# Patient Record
Sex: MALE | Race: OTHER | NOT HISPANIC OR LATINO
[De-identification: names, ages, dates, MRNs, and addresses within clinical notes are randomized per-mention and may not be internally consistent; named-entity substitution may affect disease eponyms.]

---

## 2020-10-13 PROBLEM — Z00.00 ENCOUNTER FOR PREVENTIVE HEALTH EXAMINATION: Status: ACTIVE | Noted: 2020-10-13

## 2020-10-19 ENCOUNTER — APPOINTMENT (OUTPATIENT)
Dept: SURGERY | Facility: CLINIC | Age: 45
End: 2020-10-19
Payer: COMMERCIAL

## 2020-10-19 VITALS
WEIGHT: 250 LBS | HEART RATE: 127 BPM | SYSTOLIC BLOOD PRESSURE: 152 MMHG | BODY MASS INDEX: 35.79 KG/M2 | HEIGHT: 70 IN | DIASTOLIC BLOOD PRESSURE: 113 MMHG | OXYGEN SATURATION: 96 % | TEMPERATURE: 98.7 F

## 2020-10-19 PROCEDURE — 99072 ADDL SUPL MATRL&STAF TM PHE: CPT

## 2020-10-19 PROCEDURE — 99202 OFFICE O/P NEW SF 15 MIN: CPT | Mod: 25

## 2020-10-19 NOTE — HISTORY OF PRESENT ILLNESS
[de-identified] : 44 yo male with complex surgical history related to perforated sigmoid diverticulitis who presents to the office for evaluation of left sided ostomy site hernia. He states that the area is getting larger and starting to get more noticeable.

## 2020-10-19 NOTE — PHYSICAL EXAM
[JVD] : no jugular venous distention  [Normal Breath Sounds] : Normal breath sounds [Normal Heart Sounds] : normal heart sounds [Abdominal Masses] : No abdominal masses [Normal Rate and Rhythm] : normal rate and rhythm [No HSM] : no hepatosplenomegaly [Abdomen Tenderness] : ~T ~M No abdominal tenderness [Tender] : was nontender [No Rash or Lesion] : No rash or lesion [Enlarged] : not enlarged [Oriented to Person] : oriented to person [Alert] : alert [Oriented to Time] : oriented to time [Oriented to Place] : oriented to place [Calm] : calm [de-identified] : alert and oriented x 3 [de-identified] : normal [de-identified] : reducible ostomy site hernia

## 2020-11-09 ENCOUNTER — APPOINTMENT (OUTPATIENT)
Dept: SURGERY | Facility: CLINIC | Age: 45
End: 2020-11-09

## 2021-01-12 ENCOUNTER — OUTPATIENT (OUTPATIENT)
Dept: OUTPATIENT SERVICES | Facility: HOSPITAL | Age: 46
LOS: 1 days | Discharge: HOME | End: 2021-01-12
Payer: COMMERCIAL

## 2021-01-12 VITALS
DIASTOLIC BLOOD PRESSURE: 95 MMHG | SYSTOLIC BLOOD PRESSURE: 126 MMHG | HEART RATE: 100 BPM | RESPIRATION RATE: 18 BRPM | TEMPERATURE: 98 F | OXYGEN SATURATION: 98 % | HEIGHT: 69 IN | WEIGHT: 246.92 LBS

## 2021-01-12 DIAGNOSIS — Z98.890 OTHER SPECIFIED POSTPROCEDURAL STATES: Chronic | ICD-10-CM

## 2021-01-12 DIAGNOSIS — I10 ESSENTIAL (PRIMARY) HYPERTENSION: ICD-10-CM

## 2021-01-12 DIAGNOSIS — K42.9 UMBILICAL HERNIA WITHOUT OBSTRUCTION OR GANGRENE: ICD-10-CM

## 2021-01-12 DIAGNOSIS — Z93.3 COLOSTOMY STATUS: Chronic | ICD-10-CM

## 2021-01-12 DIAGNOSIS — K46.9 UNSPECIFIED ABDOMINAL HERNIA WITHOUT OBSTRUCTION OR GANGRENE: ICD-10-CM

## 2021-01-12 DIAGNOSIS — K40.90 UNILATERAL INGUINAL HERNIA, WITHOUT OBSTRUCTION OR GANGRENE, NOT SPECIFIED AS RECURRENT: ICD-10-CM

## 2021-01-12 DIAGNOSIS — E78.5 HYPERLIPIDEMIA, UNSPECIFIED: ICD-10-CM

## 2021-01-12 DIAGNOSIS — Z01.818 ENCOUNTER FOR OTHER PREPROCEDURAL EXAMINATION: ICD-10-CM

## 2021-01-12 DIAGNOSIS — N49.3 FOURNIER GANGRENE: Chronic | ICD-10-CM

## 2021-01-12 LAB
ANION GAP SERPL CALC-SCNC: 6 MMOL/L — SIGNIFICANT CHANGE UP (ref 5–17)
APTT BLD: 37.6 SEC — HIGH (ref 27.5–35.5)
BUN SERPL-MCNC: 18 MG/DL — SIGNIFICANT CHANGE UP (ref 7–23)
CALCIUM SERPL-MCNC: 9.3 MG/DL — SIGNIFICANT CHANGE UP (ref 8.5–10.1)
CHLORIDE SERPL-SCNC: 107 MMOL/L — SIGNIFICANT CHANGE UP (ref 96–108)
CO2 SERPL-SCNC: 25 MMOL/L — SIGNIFICANT CHANGE UP (ref 22–31)
CREAT SERPL-MCNC: 0.85 MG/DL — SIGNIFICANT CHANGE UP (ref 0.5–1.3)
GLUCOSE SERPL-MCNC: 105 MG/DL — HIGH (ref 70–99)
HCT VFR BLD CALC: 53 % — HIGH (ref 39–50)
HGB BLD-MCNC: 18.1 G/DL — HIGH (ref 13–17)
INR BLD: 0.99 RATIO — SIGNIFICANT CHANGE UP (ref 0.88–1.16)
MCHC RBC-ENTMCNC: 30.5 PG — SIGNIFICANT CHANGE UP (ref 27–34)
MCHC RBC-ENTMCNC: 34.2 GM/DL — SIGNIFICANT CHANGE UP (ref 32–36)
MCV RBC AUTO: 89.2 FL — SIGNIFICANT CHANGE UP (ref 80–100)
NRBC # BLD: 0 /100 WBCS — SIGNIFICANT CHANGE UP (ref 0–0)
PLATELET # BLD AUTO: 318 K/UL — SIGNIFICANT CHANGE UP (ref 150–400)
POTASSIUM SERPL-MCNC: 4.1 MMOL/L — SIGNIFICANT CHANGE UP (ref 3.5–5.3)
POTASSIUM SERPL-SCNC: 4.1 MMOL/L — SIGNIFICANT CHANGE UP (ref 3.5–5.3)
PROTHROM AB SERPL-ACNC: 11.5 SEC — SIGNIFICANT CHANGE UP (ref 10.6–13.6)
RBC # BLD: 5.94 M/UL — HIGH (ref 4.2–5.8)
RBC # FLD: 14.2 % — SIGNIFICANT CHANGE UP (ref 10.3–14.5)
SODIUM SERPL-SCNC: 138 MMOL/L — SIGNIFICANT CHANGE UP (ref 135–145)
WBC # BLD: 10.61 K/UL — HIGH (ref 3.8–10.5)
WBC # FLD AUTO: 10.61 K/UL — HIGH (ref 3.8–10.5)

## 2021-01-12 PROCEDURE — 93010 ELECTROCARDIOGRAM REPORT: CPT

## 2021-01-12 NOTE — H&P PST ADULT - NSICDXPASTSURGICALHX_GEN_ALL_CORE_FT
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

## 2021-01-12 NOTE — H&P PST ADULT - NSANTHOSAYNRD_GEN_A_CORE
No. DANY screening performed.  STOP BANG Legend: 0-2 = LOW Risk; 3-4 = INTERMEDIATE Risk; 5-8 = HIGH Risk

## 2021-01-12 NOTE — H&P PST ADULT - HISTORY OF PRESENT ILLNESS
45M pmh htn (diet controlled), hld, diverticulosis s/p numerous abdominal surgeries here for PST for scheduled incisional hernia repair whti mesh, left inguinal hernia repair with mesh, possible open, open umbilical hernia repair  This patient denies any fever, cough, sob, flu like symptoms or travel outside of [facility] in the past 30 days

## 2021-01-12 NOTE — H&P PST ADULT - NSICDXPROBLEM_GEN_ALL_CORE_FT
PROBLEM DIAGNOSES  Problem: Umbilical hernia without obstruction and without gangrene  Assessment and Plan: Robotic assisted laparoscopic recurrent incisional hernia repair with mesh, left inguinal hernia repair with mesh, possible open, open umbilical hernia repair, possible mesh  Pre-op instructions given by PA patient verbalized understanding  Chlorhexidine wash instructions given   Medical clearance pending    Problem: Unilateral inguinal hernia, without obstruction or gangrene, not specified as recurrent  Assessment and Plan: Robotic assisted laparoscopic recurrent icisional hernia repair with mesh, left inguinal hernia repair with mesh, possible open, open umbilical hernia repari, possible mesh    Problem: Unspecified abdominal hernia without obstruction or gangrene  Assessment and Plan: Robotic assisted laparoscopic recurrent incisional hernia repair with mesh, left inguinal hernia repair with mesh, possible open, open umbilical henria repair, possible mesh    Problem: HTN (hypertension)  Assessment and Plan: diet controlled, Continue current regimen     Problem: HLD (hyperlipidemia)  Assessment and Plan: Continue current regimen and medications.

## 2021-01-12 NOTE — H&P PST ADULT - NSICDXPASTMEDICALHX_GEN_ALL_CORE_FT
PAST MEDICAL HISTORY:  Diverticulosis     HLD (hyperlipidemia)     HTN (hypertension) diet controlled

## 2021-01-16 DIAGNOSIS — Z01.818 ENCOUNTER FOR OTHER PREPROCEDURAL EXAMINATION: ICD-10-CM

## 2021-01-19 ENCOUNTER — APPOINTMENT (OUTPATIENT)
Dept: DISASTER EMERGENCY | Facility: CLINIC | Age: 46
End: 2021-01-19

## 2021-01-20 LAB — SARS-COV-2 N GENE NPH QL NAA+PROBE: NOT DETECTED

## 2021-01-21 ENCOUNTER — TRANSCRIPTION ENCOUNTER (OUTPATIENT)
Age: 46
End: 2021-01-21

## 2021-01-22 ENCOUNTER — INPATIENT (INPATIENT)
Facility: HOSPITAL | Age: 46
LOS: 0 days | Discharge: HOME | End: 2021-01-23
Attending: SURGERY | Admitting: SURGERY
Payer: COMMERCIAL

## 2021-01-22 ENCOUNTER — APPOINTMENT (OUTPATIENT)
Dept: SURGERY | Facility: HOSPITAL | Age: 46
End: 2021-01-22

## 2021-01-22 VITALS
OXYGEN SATURATION: 98 % | HEART RATE: 100 BPM | TEMPERATURE: 99 F | SYSTOLIC BLOOD PRESSURE: 126 MMHG | DIASTOLIC BLOOD PRESSURE: 88 MMHG | HEIGHT: 69 IN | WEIGHT: 246.04 LBS | RESPIRATION RATE: 18 BRPM

## 2021-01-22 DIAGNOSIS — Z98.890 OTHER SPECIFIED POSTPROCEDURAL STATES: Chronic | ICD-10-CM

## 2021-01-22 DIAGNOSIS — N49.3 FOURNIER GANGRENE: Chronic | ICD-10-CM

## 2021-01-22 DIAGNOSIS — Z93.3 COLOSTOMY STATUS: Chronic | ICD-10-CM

## 2021-01-22 PROCEDURE — 49650 LAP ING HERNIA REPAIR INIT: CPT

## 2021-01-22 PROCEDURE — 49652: CPT

## 2021-01-22 PROCEDURE — 49561: CPT | Mod: AS

## 2021-01-22 PROCEDURE — 49505 PRP I/HERN INIT REDUC >5 YR: CPT | Mod: AS

## 2021-01-22 PROCEDURE — S2900 ROBOTIC SURGICAL SYSTEM: CPT | Mod: NC

## 2021-01-22 RX ORDER — TRAZODONE HCL 50 MG
1 TABLET ORAL
Qty: 0 | Refills: 0 | DISCHARGE

## 2021-01-22 RX ORDER — IBUPROFEN 200 MG
600 TABLET ORAL EVERY 6 HOURS
Refills: 0 | Status: DISCONTINUED | OUTPATIENT
Start: 2021-01-22 | End: 2021-01-23

## 2021-01-22 RX ORDER — ATORVASTATIN CALCIUM 80 MG/1
1 TABLET, FILM COATED ORAL
Qty: 0 | Refills: 0 | DISCHARGE

## 2021-01-22 RX ORDER — CEFAZOLIN SODIUM 1 G
1000 VIAL (EA) INJECTION EVERY 8 HOURS
Refills: 0 | Status: COMPLETED | OUTPATIENT
Start: 2021-01-22 | End: 2021-01-22

## 2021-01-22 RX ORDER — HYDROMORPHONE HYDROCHLORIDE 2 MG/ML
0.5 INJECTION INTRAMUSCULAR; INTRAVENOUS; SUBCUTANEOUS
Refills: 0 | Status: DISCONTINUED | OUTPATIENT
Start: 2021-01-22 | End: 2021-01-22

## 2021-01-22 RX ORDER — SODIUM CHLORIDE 9 MG/ML
3 INJECTION INTRAMUSCULAR; INTRAVENOUS; SUBCUTANEOUS EVERY 8 HOURS
Refills: 0 | Status: DISCONTINUED | OUTPATIENT
Start: 2021-01-22 | End: 2021-01-22

## 2021-01-22 RX ORDER — GABAPENTIN 400 MG/1
1 CAPSULE ORAL
Qty: 0 | Refills: 0 | DISCHARGE

## 2021-01-22 RX ORDER — OXYCODONE HYDROCHLORIDE 5 MG/1
5 TABLET ORAL EVERY 4 HOURS
Refills: 0 | Status: DISCONTINUED | OUTPATIENT
Start: 2021-01-22 | End: 2021-01-23

## 2021-01-22 RX ORDER — HEPARIN SODIUM 5000 [USP'U]/ML
5000 INJECTION INTRAVENOUS; SUBCUTANEOUS EVERY 12 HOURS
Refills: 0 | Status: DISCONTINUED | OUTPATIENT
Start: 2021-01-22 | End: 2021-01-23

## 2021-01-22 RX ORDER — SODIUM CHLORIDE 9 MG/ML
1000 INJECTION, SOLUTION INTRAVENOUS
Refills: 0 | Status: DISCONTINUED | OUTPATIENT
Start: 2021-01-22 | End: 2021-01-23

## 2021-01-22 RX ORDER — OXYCODONE HYDROCHLORIDE 5 MG/1
10 TABLET ORAL EVERY 4 HOURS
Refills: 0 | Status: DISCONTINUED | OUTPATIENT
Start: 2021-01-22 | End: 2021-01-23

## 2021-01-22 RX ORDER — ACETAMINOPHEN 500 MG
975 TABLET ORAL EVERY 6 HOURS
Refills: 0 | Status: DISCONTINUED | OUTPATIENT
Start: 2021-01-22 | End: 2021-01-23

## 2021-01-22 RX ORDER — CYCLOBENZAPRINE HYDROCHLORIDE 10 MG/1
10 TABLET, FILM COATED ORAL THREE TIMES A DAY
Refills: 0 | Status: DISCONTINUED | OUTPATIENT
Start: 2021-01-22 | End: 2021-01-23

## 2021-01-22 RX ORDER — SODIUM CHLORIDE 9 MG/ML
1000 INJECTION, SOLUTION INTRAVENOUS
Refills: 0 | Status: DISCONTINUED | OUTPATIENT
Start: 2021-01-22 | End: 2021-01-22

## 2021-01-22 RX ADMIN — HYDROMORPHONE HYDROCHLORIDE 0.5 MILLIGRAM(S): 2 INJECTION INTRAMUSCULAR; INTRAVENOUS; SUBCUTANEOUS at 18:52

## 2021-01-22 RX ADMIN — OXYCODONE HYDROCHLORIDE 10 MILLIGRAM(S): 5 TABLET ORAL at 21:46

## 2021-01-22 RX ADMIN — HEPARIN SODIUM 5000 UNIT(S): 5000 INJECTION INTRAVENOUS; SUBCUTANEOUS at 23:23

## 2021-01-22 RX ADMIN — SODIUM CHLORIDE 125 MILLILITER(S): 9 INJECTION, SOLUTION INTRAVENOUS at 21:49

## 2021-01-22 RX ADMIN — HYDROMORPHONE HYDROCHLORIDE 0.5 MILLIGRAM(S): 2 INJECTION INTRAMUSCULAR; INTRAVENOUS; SUBCUTANEOUS at 15:47

## 2021-01-22 RX ADMIN — Medication 100 MILLIGRAM(S): at 15:42

## 2021-01-22 RX ADMIN — Medication 600 MILLIGRAM(S): at 20:43

## 2021-01-22 RX ADMIN — HYDROMORPHONE HYDROCHLORIDE 0.5 MILLIGRAM(S): 2 INJECTION INTRAMUSCULAR; INTRAVENOUS; SUBCUTANEOUS at 15:23

## 2021-01-22 RX ADMIN — SODIUM CHLORIDE 75 MILLILITER(S): 9 INJECTION, SOLUTION INTRAVENOUS at 12:12

## 2021-01-22 RX ADMIN — HYDROMORPHONE HYDROCHLORIDE 0.5 MILLIGRAM(S): 2 INJECTION INTRAMUSCULAR; INTRAVENOUS; SUBCUTANEOUS at 13:18

## 2021-01-22 RX ADMIN — Medication 975 MILLIGRAM(S): at 20:43

## 2021-01-22 NOTE — BRIEF OPERATIVE NOTE - NSICDXBRIEFPROCEDURE_GEN_ALL_CORE_FT
PROCEDURES:  Repair, hernia, umbilical, robot-assisted 22-Jan-2021 11:38:08 repair with mesh Marion Weinstein  Robot-assisted repair of inguinal hernia 22-Jan-2021 11:37:40 LIH repair w/ mesh Marion Weinstein  Robot-assisted repair of incisional hernia 22-Jan-2021 11:37:26  Marion Weinstein

## 2021-01-22 NOTE — PROGRESS NOTE ADULT - SUBJECTIVE AND OBJECTIVE BOX
POSTOP CHECK:  S/p Robot assisted laparoscopic repair of umbilical hernia with mesh, Recurrent Left inguinal hernia with mesh, and Incisional hernia POD 0.   Patient c/o postop abdominal pain, mostly over incisional hernia repair, rated as 5/10 in severity, well controlled with pain medications. Denies N/V. Denies Flatus or BM yet.   Tolerating regular diet.  Voiding well postop.   Denies fever/chills, chest pain, or sob.     Vital Signs Last 24 Hrs  T(C): 36.6 (22 Jan 2021 22:00), Max: 37.1 (22 Jan 2021 06:39)  T(F): 97.9 (22 Jan 2021 22:00), Max: 98.7 (22 Jan 2021 06:39)  HR: 87 (22 Jan 2021 22:00) (83 - 102)  BP: 106/66 (22 Jan 2021 22:00) (100/57 - 126/88)  RR: 18 (22 Jan 2021 22:00) (12 - 19)  SpO2: 95% (22 Jan 2021 22:00) (93% - 98%)    PHYSICAL EXAM:    GENERAL: NAD  CHEST/LUNG: Clear to ausculation, bilaterally   HEART: S1S2, RRR  ABDOMEN: Dressings over port sites CDI. Mildly distended. Soft, appropriate incisional tenderness, no guarding.   EXTREMITIES:  calf soft, non tender b/l. 2+ distal pulses b/l.     I&O's Detail    22 Jan 2021 07:01  -  22 Jan 2021 23:10  --------------------------------------------------------  IN:    IV PiggyBack: 50 mL    Lactated Ringers: 535 mL  Total IN: 585 mL    OUT:    Indwelling Catheter - Urethral (mL): 600 mL  Total OUT: 600 mL    Total NET: -15 mL      A/P: 45M with PMH HTN (diet controlled), HLD, diverticulitis admitted s/p Elective Robot-assisted laparoscopic repair of Umbilical hernia with mesh, Recurrent LIH with mesh and incisional hernia POD 0.  Stable.    -Regular diet as tolerated  -local wound care  -pain control PRN  -DVT ppx  -OOB to ambulate as tolerated, Incentive spirometer   -medical management and supportive care  -D/C planning Saturday if pain controlled  -Discussed with attending

## 2021-01-22 NOTE — PATIENT PROFILE ADULT - NSPRESCRALCSIXMORE_GEN_A_NUR
Problem: Patient Care Overview (Adult)  Goal: Plan of Care Review  Outcome: Ongoing (interventions implemented as appropriate)    07/20/17 0953   Patient Care Overview   Progress improving   Outcome Evaluation   Outcome Summary/Follow up Plan no noted problems            Monthly

## 2021-01-22 NOTE — BRIEF OPERATIVE NOTE - NSICDXBRIEFPOSTOP_GEN_ALL_CORE_FT
POST-OP DIAGNOSIS:  Umbilical hernia 22-Jan-2021 11:39:55  Marion Weinstein  Incarcerated incisional hernia 22-Jan-2021 11:39:41  Marion Weinstein  Left inguinal hernia 22-Jan-2021 11:39:33  Marion Weinstein

## 2021-01-22 NOTE — BRIEF OPERATIVE NOTE - NSICDXBRIEFPREOP_GEN_ALL_CORE_FT
PRE-OP DIAGNOSIS:  Left inguinal hernia 22-Jan-2021 11:39:17  Marion Weinstein  Incarcerated incisional hernia 22-Jan-2021 11:39:04  Marion Weinstein  Umbilical hernia 22-Jan-2021 11:39:26  Marion Weinstein

## 2021-01-22 NOTE — ASU PATIENT PROFILE, ADULT - PSH
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

## 2021-01-23 ENCOUNTER — TRANSCRIPTION ENCOUNTER (OUTPATIENT)
Age: 46
End: 2021-01-23

## 2021-01-23 VITALS
SYSTOLIC BLOOD PRESSURE: 146 MMHG | TEMPERATURE: 98 F | OXYGEN SATURATION: 95 % | HEART RATE: 97 BPM | RESPIRATION RATE: 18 BRPM | DIASTOLIC BLOOD PRESSURE: 80 MMHG

## 2021-01-23 RX ORDER — IBUPROFEN 200 MG
1 TABLET ORAL
Qty: 0 | Refills: 0 | DISCHARGE
Start: 2021-01-23

## 2021-01-23 RX ORDER — OXYCODONE HYDROCHLORIDE 5 MG/1
1 TABLET ORAL
Qty: 20 | Refills: 0
Start: 2021-01-23 | End: 2021-01-27

## 2021-01-23 RX ORDER — CYCLOBENZAPRINE HYDROCHLORIDE 10 MG/1
1 TABLET, FILM COATED ORAL
Qty: 10 | Refills: 0
Start: 2021-01-23 | End: 2021-01-27

## 2021-01-23 RX ORDER — HYDROMORPHONE HYDROCHLORIDE 2 MG/ML
0.5 INJECTION INTRAMUSCULAR; INTRAVENOUS; SUBCUTANEOUS ONCE
Refills: 0 | Status: DISCONTINUED | OUTPATIENT
Start: 2021-01-23 | End: 2021-01-23

## 2021-01-23 RX ORDER — ACETAMINOPHEN 500 MG
3 TABLET ORAL
Qty: 0 | Refills: 0 | DISCHARGE
Start: 2021-01-23

## 2021-01-23 RX ADMIN — Medication 975 MILLIGRAM(S): at 03:54

## 2021-01-23 RX ADMIN — Medication 600 MILLIGRAM(S): at 06:32

## 2021-01-23 RX ADMIN — HYDROMORPHONE HYDROCHLORIDE 0.5 MILLIGRAM(S): 2 INJECTION INTRAMUSCULAR; INTRAVENOUS; SUBCUTANEOUS at 00:33

## 2021-01-23 RX ADMIN — CYCLOBENZAPRINE HYDROCHLORIDE 10 MILLIGRAM(S): 10 TABLET, FILM COATED ORAL at 00:17

## 2021-01-23 RX ADMIN — CYCLOBENZAPRINE HYDROCHLORIDE 10 MILLIGRAM(S): 10 TABLET, FILM COATED ORAL at 06:32

## 2021-01-23 RX ADMIN — OXYCODONE HYDROCHLORIDE 10 MILLIGRAM(S): 5 TABLET ORAL at 03:34

## 2021-01-23 NOTE — DISCHARGE NOTE NURSING/CASE MANAGEMENT/SOCIAL WORK - NSDCFUADDAPPT_GEN_ALL_CORE_FT
Follow up with Dr. Quezaad in the office in 1-2 weeks, call the office to schedule your appointment. If any concerns/issues, please call Dr. Quezada and see him in the office Monday if needed.  Follow up with your PCP.

## 2021-01-23 NOTE — PROGRESS NOTE ADULT - SUBJECTIVE AND OBJECTIVE BOX
INTERVAL HPI/OVERNIGHT EVENTS:  S/p Robot assisted laparoscopic repair of umbilical hernia with mesh, Recurrent Left inguinal hernia with mesh, and Incisional hernia POD #1.    Patient c/o postop abdominal pain, well controlled with pain medications. Denies N/V. Tolerating diet. No acute overnight events.   Admits to passing flatus overnight, denies BM. Has not been out of bed yet.  Voiding well postop.   Denies fever/chills, chest pain, or sob.     Vital Signs Last 24 Hrs  T(C): 36.7 (23 Jan 2021 04:30), Max: 37.1 (22 Jan 2021 06:39)  T(F): 98 (23 Jan 2021 04:30), Max: 98.7 (22 Jan 2021 06:39)  HR: 82 (23 Jan 2021 04:30) (82 - 102)  BP: 109/68 (23 Jan 2021 04:30) (100/57 - 126/88)  RR: 18 (23 Jan 2021 04:30) (12 - 19)  SpO2: 94% (23 Jan 2021 04:30) (92% - 98%)    PHYSICAL EXAM:  GENERAL: NAD  CHEST/LUNG: Clear to ausculation, bilaterally   HEART: S1S2, RRR  ABDOMEN: Dressings over port sites CDI. Mildly distended. Soft, appropriate incisional tenderness, no guarding.   EXTREMITIES:  calf soft, non tender b/l. 2+ distal pulses b/l.     I&O's Detail    22 Jan 2021 07:01  -  23 Jan 2021 05:49  --------------------------------------------------------  IN:    IV PiggyBack: 50 mL    Lactated Ringers: 535 mL  Total IN: 585 mL    OUT:    Indwelling Catheter - Urethral (mL): 600 mL    Voided (mL): 400 mL  Total OUT: 1000 mL    Total NET: -415 mL      A/P: 45M with PMH HTN (diet controlled), HLD, diverticulitis admitted s/p Elective Robot-assisted laparoscopic repair of Umbilical hernia with mesh, Recurrent LIH with mesh and incisional hernia POD #1, HD stable.    -Regular diet as tolerated  -local wound care  -pain control PRN  -DVT ppx  -OOB to ambulate as tolerated, Incentive spirometer   -medical management and supportive care  -D/C planning today  -Will discuss with surgical attending    INTERVAL HPI/OVERNIGHT EVENTS:  S/p Robot assisted laparoscopic repair of umbilical hernia with mesh, Recurrent Left inguinal hernia with mesh, and Incisional hernia POD #1.    Patient c/o postop abdominal pain, well controlled with pain medications. Denies N/V. Tolerating diet. No acute overnight events.   Admits to passing flatus overnight, denies BM. Has not been out of bed yet.  Voiding well postop.   Denies fever/chills, chest pain, or sob.     Vital Signs Last 24 Hrs  T(C): 36.7 (23 Jan 2021 04:30), Max: 37.1 (22 Jan 2021 06:39)  T(F): 98 (23 Jan 2021 04:30), Max: 98.7 (22 Jan 2021 06:39)  HR: 82 (23 Jan 2021 04:30) (82 - 102)  BP: 109/68 (23 Jan 2021 04:30) (100/57 - 126/88)  RR: 18 (23 Jan 2021 04:30) (12 - 19)  SpO2: 94% (23 Jan 2021 04:30) (92% - 98%)    PHYSICAL EXAM:  GENERAL: NAD  CHEST/LUNG: Clear to ausculation, bilaterally   HEART: S1S2, RRR  ABDOMEN: Dressings over port sites CDI. Mildly distended. Soft, appropriate incisional tenderness, no guarding.   EXTREMITIES:  calf soft, non tender b/l. 2+ distal pulses b/l.     I&O's Detail    22 Jan 2021 07:01  -  23 Jan 2021 05:49  --------------------------------------------------------  IN:    IV PiggyBack: 50 mL    Lactated Ringers: 535 mL  Total IN: 585 mL    OUT:    Indwelling Catheter - Urethral (mL): 600 mL    Voided (mL): 400 mL  Total OUT: 1000 mL    Total NET: -415 mL      A/P: 45M with PMH HTN (diet controlled), HLD, diverticulitis admitted s/p Elective Robot-assisted laparoscopic repair of Umbilical hernia with mesh, Recurrent LIH with mesh and incisional hernia POD #1, HD stable.    -Regular diet as tolerated  -local wound care  -pain control PRN  -DVT ppx  -OOB to ambulate as tolerated, Incentive spirometer   -medical management and supportive care  Discussed with LAURA Grigsby to d/c patient home today, f/u in the office

## 2021-01-23 NOTE — DISCHARGE NOTE PROVIDER - CARE PROVIDER_API CALL
Son Quezada)  Surgery  [address] [place] [address]  Phone: [phone]  Fax: [phone]  Follow Up Time:

## 2021-01-23 NOTE — DISCHARGE NOTE NURSING/CASE MANAGEMENT/SOCIAL WORK - PATIENT PORTAL LINK FT
You can access the FollowMyHealth Patient Portal offered by [facility] by registering at the following website: [email or web address]. By joining [employer]’s FollowMyHealth portal, you will also be able to view your health information using other applications (apps) compatible with our system.

## 2021-01-23 NOTE — DISCHARGE NOTE PROVIDER - NSDCMRMEDTOKEN_GEN_ALL_CORE_FT
gabapentin: 1 tab(s) orally once a day  Lipitor: 1 tab(s) orally once a day  traZODone: 1 tab(s) orally once a day   acetaminophen 325 mg oral tablet: 3 tab(s) orally every 6 hours, As Needed  cyclobenzaprine 5 mg oral tablet: 1 tab(s) orally 2 times a day, As Needed -for muscle spasm   gabapentin: 1 tab(s) orally once a day  ibuprofen 600 mg oral tablet: 1 tab(s) orally every 6 hours, As Needed  Lipitor: 1 tab(s) orally once a day  oxyCODONE 5 mg oral tablet: 1-2 tab(s) orally every 6 hours, As Needed -for severe pain MDD:4   traZODone: 1 tab(s) orally once a day

## 2021-01-23 NOTE — DISCHARGE NOTE NURSING/CASE MANAGEMENT/SOCIAL WORK - NSDCPEWEB_GEN_ALL_CORE
[facility] for Tobacco Control website --- [email or web address] website --- [email or web address]

## 2021-01-23 NOTE — DISCHARGE NOTE PROVIDER - NSDCFUADDAPPT_GEN_ALL_CORE_FT
Follow up with Dr. Quezada in the office in 1-2 weeks, call the office to schedule your appointment.   Follow up with your PCP.  Follow up with Dr. Quezada in the office in 1-2 weeks, call the office to schedule your appointment. If any concerns/issues, please call Dr. Quezada and see him in the office Monday if needed.  Follow up with your PCP.

## 2021-01-23 NOTE — DISCHARGE NOTE PROVIDER - HOSPITAL COURSE
45M with PMH HTN (diet controlled), HLD, diverticulitis admitted s/p Elective Robot-assisted laparoscopic repair of Umbilical hernia with mesh, Recurrent LIH with mesh and incisional hernia. Patient was admitted for observation and pain control. Patient did well postop, tolerated advancement of diet with return of bowel function. Voiding and ambulating well. Pain adequately controlled. Vitals stable.  Patient hemodynamically stable at time of discharge on POD #1 once tolerating a regular diet and pain controlled. Follow up with Dr. Quezada in the office in 1-2 weeks.

## 2021-01-23 NOTE — DISCHARGE NOTE NURSING/CASE MANAGEMENT/SOCIAL WORK - NSDCPEEMAIL_GEN_ALL_CORE
[facility] for Tobacco Control email [email or web address]

## 2021-01-23 NOTE — DISCHARGE NOTE PROVIDER - NSDCFUADDINST_GEN_ALL_CORE_FT
OK to shower after 48hrs, allow soapy water to run over abdomen and pat dry. Allow steri strip bandages to fall off by themselves.     OK to use OTC Tylenol and/or Motrin for mild-moderate pain in addition to prescribed narcotic pain medication. Do not drive if taking prescribed pain medications.    Please notify MD sooner or return to the ER with any new or worsening symptoms, uncontrollable pain, foul smelling discharge or drainage from wound, excessive bleeding or swelling, fever >101, chest pain, shortness of breath, abdominal pain, nausea/vomiting, or other concerns/problems.

## 2021-01-23 NOTE — DISCHARGE NOTE PROVIDER - NSDCCPCAREPLAN_GEN_ALL_CORE_FT
PRINCIPAL DISCHARGE DIAGNOSIS  Diagnosis: Umbilical hernia  Assessment and Plan of Treatment: s/p robot-assisted laparoscopic repair of umbilical hernia with mesh      SECONDARY DISCHARGE DIAGNOSES  Diagnosis: Incisional hernia  Assessment and Plan of Treatment: s/p Robot-assisted laparoscopic repair of incisional hernia    Diagnosis: Recurrent left inguinal hernia  Assessment and Plan of Treatment: S/p robot-assisted laparoscopic repair of LIH with mesh

## 2021-01-27 PROBLEM — E78.5 HYPERLIPIDEMIA, UNSPECIFIED: Chronic | Status: ACTIVE | Noted: 2021-01-12

## 2021-01-27 PROBLEM — I10 ESSENTIAL (PRIMARY) HYPERTENSION: Chronic | Status: ACTIVE | Noted: 2021-01-12

## 2021-01-27 PROBLEM — K57.90 DIVERTICULOSIS OF INTESTINE, PART UNSPECIFIED, WITHOUT PERFORATION OR ABSCESS WITHOUT BLEEDING: Chronic | Status: ACTIVE | Noted: 2021-01-12

## 2021-02-01 DIAGNOSIS — I10 ESSENTIAL (PRIMARY) HYPERTENSION: ICD-10-CM

## 2021-02-01 DIAGNOSIS — K40.90 UNILATERAL INGUINAL HERNIA, WITHOUT OBSTRUCTION OR GANGRENE, NOT SPECIFIED AS RECURRENT: ICD-10-CM

## 2021-02-01 DIAGNOSIS — K42.9 UMBILICAL HERNIA WITHOUT OBSTRUCTION OR GANGRENE: ICD-10-CM

## 2021-02-01 DIAGNOSIS — K43.2 INCISIONAL HERNIA WITHOUT OBSTRUCTION OR GANGRENE: ICD-10-CM

## 2021-02-01 DIAGNOSIS — E78.5 HYPERLIPIDEMIA, UNSPECIFIED: ICD-10-CM

## 2021-02-01 DIAGNOSIS — K66.0 PERITONEAL ADHESIONS (POSTPROCEDURAL) (POSTINFECTION): ICD-10-CM

## 2021-02-04 ENCOUNTER — APPOINTMENT (OUTPATIENT)
Dept: SURGERY | Facility: CLINIC | Age: 46
End: 2021-02-04
Payer: COMMERCIAL

## 2021-02-04 VITALS
TEMPERATURE: 98.6 F | DIASTOLIC BLOOD PRESSURE: 90 MMHG | WEIGHT: 250 LBS | SYSTOLIC BLOOD PRESSURE: 138 MMHG | BODY MASS INDEX: 35.79 KG/M2 | OXYGEN SATURATION: 98 % | HEIGHT: 70 IN | HEART RATE: 129 BPM

## 2021-02-04 PROCEDURE — 99024 POSTOP FOLLOW-UP VISIT: CPT

## 2021-02-04 NOTE — HISTORY OF PRESENT ILLNESS
[de-identified] : pt seen and examined. pt is s/p robotic assisted incisional hernia and left inguinal hernia. wound edges are healing well. there is seroma which i advised the patient that it will get better overtime. pt will come back in six weeks for follow up visit.

## 2021-03-15 ENCOUNTER — APPOINTMENT (OUTPATIENT)
Dept: SURGERY | Facility: CLINIC | Age: 46
End: 2021-03-15
Payer: COMMERCIAL

## 2021-03-15 VITALS
HEART RATE: 130 BPM | TEMPERATURE: 96.8 F | HEIGHT: 70 IN | BODY MASS INDEX: 35.79 KG/M2 | SYSTOLIC BLOOD PRESSURE: 119 MMHG | WEIGHT: 250 LBS | DIASTOLIC BLOOD PRESSURE: 98 MMHG | OXYGEN SATURATION: 97 %

## 2021-03-15 DIAGNOSIS — K46.9 UNSPECIFIED ABDOMINAL HERNIA W/OUT OBSTRUCTION OR GANGRENE: ICD-10-CM

## 2021-03-15 PROCEDURE — 99024 POSTOP FOLLOW-UP VISIT: CPT

## 2021-03-15 NOTE — HISTORY OF PRESENT ILLNESS
[de-identified] : Pt seen and examined. Pt is s/p robotic assisted ventral hernia and left inguinal hernia. Wound edges are healing well and seroma has resolved. Pt was advised to resume all activities as tolerated.

## 2021-07-04 NOTE — H&P PST ADULT - ASSESSMENT
Statement Selected
45M pmh htn (diet controlled), hld, diverticulosis s/p numerous abdominal surgeries here for PST for scheduled incisional hernia repair whti mesh, left inguinal hernia repair with mesh, possible open, open umbilical hernia repair  CAPRINI SCORE    AGE RELATED RISK FACTORS                                                       MOBILITY RELATED FACTORS  [ x] Age 41-60 years                                            (1 Point)                  [ ] Bed rest                                                        (1 Point)  [ ] Age: 61-74 years                                           (2 Points)                [ ] Plaster cast                                                   (2 Points)  [ ] Age= 75 years                                              (3 Points)                 [ ] Bed bound for more than 72 hours                   (2 Points)    DISEASE RELATED RISK FACTORS                                               GENDER SPECIFIC FACTORS  [ ] Edema in the lower extremities                       (1 Point)                  [ ] Pregnancy                                                     (1 Point)  [ ] Varicose veins                                               (1 Point)                  [ ] Post-partum < 6 weeks                                   (1 Point)             [x ] BMI > 25 Kg/m2                                            (1 Point)                  [ ] Hormonal therapy  or oral contraception            (1 Point)                 [ ] Sepsis (in the previous month)                        (1 Point)                  [ ] History of pregnancy complications  [ ] Pneumonia or serious lung disease                                               [ ] Unexplained or recurrent                       (1 Point)           (in the previous month)                               (1 Point)  [ ] Abnormal pulmonary function test                     (1 Point)                 SURGERY RELATED RISK FACTORS  [ ] Acute myocardial infarction                              (1 Point)                 [ ]  Section                                            (1 Point)  [ ] Congestive heart failure (in the previous month)  (1 Point)                 [ ] Minor surgery                                                 (1 Point)   [ ] Inflammatory bowel disease                             (1 Point)                 [ ] Arthroscopic surgery                                        (2 Points)  [ ] Central venous access                                    (2 Points)                [x ] General surgery lasting more than 45 minutes   (2 Points)       [ ] Stroke (in the previous month)                          (5 Points)               [ ] Elective arthroplasty                                        (5 Points)                                                                                                                                               HEMATOLOGY RELATED FACTORS                                                 TRAUMA RELATED RISK FACTORS  [ ] Prior episodes of VTE                                     (3 Points)                 [ ] Fracture of the hip, pelvis, or leg                       (5 Points)  [ ] Positive family history for VTE                         (3 Points)                 [ ] Acute spinal cord injury (in the previous month)  (5 Points)  [ ] Prothrombin 77104 A                                      (3 Points)                 [ ] Paralysis  (less than 1 month)                          (5 Points)  [ ] Factor V Leiden                                             (3 Points)                 [ ] Multiple Trauma within 1 month                         (5 Points)  [ ] Lupus anticoagulants                                     (3 Points)                                                           [ ] Anticardiolipin antibodies                                (3 Points)                                                       [ ] High homocysteine in the blood                      (3 Points)                                             [ ] Other congenital or acquired thrombophilia       (3 Points)                                                [ ] Heparin induced thrombocytopenia                  (3 Points)                                          Total Score [   4       ]

## 2021-12-13 ENCOUNTER — EMERGENCY (EMERGENCY)
Facility: HOSPITAL | Age: 46
LOS: 1 days | Discharge: HOME | End: 2021-12-13
Attending: EMERGENCY MEDICINE
Payer: COMMERCIAL

## 2021-12-13 VITALS
WEIGHT: 244.93 LBS | HEIGHT: 69 IN | HEART RATE: 114 BPM | OXYGEN SATURATION: 97 % | DIASTOLIC BLOOD PRESSURE: 97 MMHG | RESPIRATION RATE: 19 BRPM | TEMPERATURE: 98 F | SYSTOLIC BLOOD PRESSURE: 143 MMHG

## 2021-12-13 DIAGNOSIS — Z98.890 OTHER SPECIFIED POSTPROCEDURAL STATES: Chronic | ICD-10-CM

## 2021-12-13 DIAGNOSIS — Z90.49 ACQUIRED ABSENCE OF OTHER SPECIFIED PARTS OF DIGESTIVE TRACT: Chronic | ICD-10-CM

## 2021-12-13 DIAGNOSIS — N49.3 FOURNIER GANGRENE: Chronic | ICD-10-CM

## 2021-12-13 DIAGNOSIS — Z87.19 PERSONAL HISTORY OF OTHER DISEASES OF THE DIGESTIVE SYSTEM: Chronic | ICD-10-CM

## 2021-12-13 DIAGNOSIS — Z93.3 COLOSTOMY STATUS: Chronic | ICD-10-CM

## 2021-12-13 PROCEDURE — 99284 EMERGENCY DEPT VISIT MOD MDM: CPT

## 2021-12-13 RX ORDER — IBUPROFEN 200 MG
400 TABLET ORAL ONCE
Refills: 0 | Status: COMPLETED | OUTPATIENT
Start: 2021-12-13 | End: 2021-12-13

## 2021-12-13 RX ORDER — ACETAMINOPHEN 500 MG
650 TABLET ORAL ONCE
Refills: 0 | Status: COMPLETED | OUTPATIENT
Start: 2021-12-13 | End: 2021-12-13

## 2021-12-13 RX ADMIN — Medication 400 MILLIGRAM(S): at 17:02

## 2021-12-13 RX ADMIN — Medication 650 MILLIGRAM(S): at 17:02

## 2021-12-13 NOTE — ED ADULT NURSE NOTE - OBJECTIVE STATEMENT
Pt reports reoccurrence of testicular abscess over past 5 days.  Denies fevers, chills or drainage.  There is a scabbed over area, surrounded by mild swelling on right testicle.  States area is itchy.

## 2021-12-13 NOTE — ED PROVIDER NOTE - PATIENT PORTAL LINK FT
You can access the FollowMyHealth Patient Portal offered by [facility] by registering at the following website: [email or web address]. By joining [employer]’s FollowMyHealth portal, you will also be able to view your health information using other applications (apps) compatible with our system.

## 2021-12-13 NOTE — ED PROVIDER NOTE - NSFOLLOWUPINSTRUCTIONS_ED_ALL_ED_FT
Follow up with your Urologist in 2-3 days or call our clinic at [phone]//[phone].    Take your antibiotics until they are fully completed.    There were no signs of an emergency medical condition on completion of today's workup.  You will need further medical care and evaluation. A presumptive diagnosis has been made, however further evaluation may be required by your primary care doctor or specialist for a definitive diagnosis.      Follow up with your medical doctor in 2-3 days or call our clinic at [phone] and state you were seen in the Emergency Department and would like to be seen in clinic. You may also call [phone] to speak with a representative to assist follow up care with medicine, surgery, or specialists.    If you are having pain, take Tylenol/acetaminophen 1 g every six hours and supplement (if allowed by your physician, and if you're not having gastric/gastrointestinal/stomach/intestinal problems) with ibuprofen 600 mg, with food or milk/maalox, every six hours which can be taken three hours apart from the Tylenol to have a layered effect.     Be sure to take no more than 4000mg or 4g of Tylenol/acetaminophen in a 24 hour period. Be sure to check your other medications to see if they include Tylenol/acetaminophen and include them in your calculations to ensure you do not take more than 4000mg or 4g of Tylenol/acetaminophen a day.    Drink at least 2 Liters or 64 Ounces of water each day (UNLESS you are supposed to restrict fluids or have a history of congestive heart failure (CHF)).    Return for any persistent, worsening symptoms, or ANY concerns at all.

## 2021-12-13 NOTE — ED PROVIDER NOTE - CLINICAL SUMMARY MEDICAL DECISION MAKING FREE TEXT BOX
Harpreet Woodall MD, FACEP: In this physician's medical judgement based on clinical history and physical exam, patient with scrotal pain and swelling at 5:30 location with history of Columba's gangrene now with fluctuance at scrotum and concern for infection. Will consult uro PA and follow plan of care for urology team for scrotal lesion with excoriation.    *The above represents an initial assessment/impression. Please refer to progress notes for potential changes in patient clinical course*

## 2021-12-13 NOTE — ED PROVIDER NOTE - PHYSICAL EXAMINATION
GEN: mild distress secondary to pain, awake, eyes open spontaneously  EYES: normal conjunctiva, perrl  ENT: NCAT, MMM, Trachea midline  CHEST/LUNGS: Non-tachypneic, CTAB, bilateral breath sounds  CARDIAC: Non-tachycardic, normal perfusion  ABDOMEN: Soft, NTND, No rebound/guarding, obese by bmi  : indurated intertriginous areas with darkening skin changes, black eschar at 5:50 on base of scrotum, mild 1cm fluctuant region at this location without active drainage, no palpated increased warmth, no inguinal hernias palpated  MSK: No edema, no gross deformity of extremities  SKIN: No rashes, no petechiae, no vesicles  NEURO: CN grossly intact, normal coordination, no focal motor or sensory deficits  PSYCH: Alert, appropriate, cooperative, with capacity and insight

## 2021-12-13 NOTE — ED PROVIDER NOTE - NSICDXPASTMEDICALHX_GEN_ALL_CORE_FT
PAST MEDICAL HISTORY:  History of diverticulitis     History of Columba's gangrene     Hyperlipidemia     Hypertension

## 2021-12-13 NOTE — ED PROVIDER NOTE - PROGRESS NOTE DETAILS
Deya RAMIREZ paged MELANY Eubanks consulted for evaluation and treatment. Yina: Patient returned from Ultrasound and states with pressure, abscess started to drain pus, and blood.  spoke with urology. urology PA awaiting radiology results and attempting to contact patient urologist. Lyssa: patient abscess examined and draining pus, small puncture wound. Patient requesting to go home. spoke with urology again and awaiting attending callback still. will give iv abx. Yina: Patient declining iv abx. requesting to go home. has f/u on friday with his own urologist.  will give oral antibiotics and d/c home. urology PA at bedside and evaluated. will d/c home at this time. Yina: Patient declining iv abx. requesting to go home. has f/u on friday with his own urologist.  will give oral antibiotics and d/c home. urology PA at bedside and evaluated. will d/c home at this time.  spoke with patient has one partner for 20 years his wife.

## 2021-12-13 NOTE — ED PROVIDER NOTE - RAPID ASSESSMENT
46y M presents to ED c/o abscess to R testicle x4 days. Reports worsening pain and redness to site. Has appointment with uro this Friday but pain too severe and pt couldn't wait until then. Denies fever, abd pain, nausea, and vomiting. No hx of DM. Denies IV drug use or injecting anything to site.     Patient was seen as a tele QDOC patient. The patient will be seen and further worked up in the main emergency department and their care will be completed by the main emergency department team along with a thorough physical exam. Receiving team will follow up on labs, analgesia, any clinical imaging, reassess and disposition as clinically indicated, all decisions regarding the progression of care will be made at their discretion.    Scribe Statement: I, Ramandeep Albrecht, attest that this documentation has been prepared under the direction and in the presence of Mina Boland) 46y M presents to ED c/o abscess to R testicle x4 days. Reports worsening pain and redness to site. Has appointment with uro this Friday but pain too severe and pt couldn't wait until then. Denies fever, abd pain, nausea, and vomiting. No hx of DM. Denies IV drug use or injecting anything to site.     Patient was seen as a tele QDOC patient. The patient will be seen and further worked up in the main emergency department and their care will be completed by the main emergency department team along with a thorough physical exam. Receiving team will follow up on labs, analgesia, any clinical imaging, reassess and disposition as clinically indicated, all decisions regarding the progression of care will be made at their discretion.    Scribe Statement: I, Ramandeep Albrecht, attest that this documentation has been prepared under the direction and in the presence of Mina Boland)    Krystian ALDANA: The scribe's documentation has been prepared under my direction and personally reviewed by me in its entirety. I confirm that the note above accurately reflects all work, treatment, procedures, and medical decision making performed by me (Dr. Boland).     Attending Note --     I saw the patient waiting area via televideo connection; a brief history was taken and a thorough physical exam was not performed as there is no physical exam room available.  The patient will be seen and further worked up in the main emergency department and their care will be completed by the main emergency department team.  I was not involved in this patient's care during the QDOC process, and unless otherwise noted in the ED provider note, was not involved in their care during their ED course.  --ANTONIA

## 2021-12-13 NOTE — ED PROVIDER NOTE - OBJECTIVE STATEMENT
Patient with lesion to the bottom of his scrotum. Patient states he's felt a lump for some time but that it became more fluid filled in sensation and had questionable signs of when he had a prior infection and swelling consistent with gangrene. Patient is unsure if he has had drainage from the area. No fever chills. Patient states worsening over the past three days concerning for a collection.

## 2021-12-14 VITALS
HEART RATE: 100 BPM | SYSTOLIC BLOOD PRESSURE: 145 MMHG | RESPIRATION RATE: 18 BRPM | DIASTOLIC BLOOD PRESSURE: 100 MMHG | OXYGEN SATURATION: 98 % | TEMPERATURE: 98 F

## 2021-12-14 LAB
ALBUMIN SERPL ELPH-MCNC: 4.5 G/DL — SIGNIFICANT CHANGE UP (ref 3.3–5)
ALP SERPL-CCNC: 120 U/L — SIGNIFICANT CHANGE UP (ref 40–120)
ALT FLD-CCNC: 35 U/L — SIGNIFICANT CHANGE UP (ref 10–45)
ANION GAP SERPL CALC-SCNC: 12 MMOL/L — SIGNIFICANT CHANGE UP (ref 5–17)
APPEARANCE UR: CLEAR — SIGNIFICANT CHANGE UP
APTT BLD: 32.8 SEC — SIGNIFICANT CHANGE UP (ref 27.5–35.5)
AST SERPL-CCNC: 18 U/L — SIGNIFICANT CHANGE UP (ref 10–40)
BACTERIA # UR AUTO: NEGATIVE — SIGNIFICANT CHANGE UP
BASOPHILS # BLD AUTO: 0.12 K/UL — SIGNIFICANT CHANGE UP (ref 0–0.2)
BASOPHILS NFR BLD AUTO: 0.8 % — SIGNIFICANT CHANGE UP (ref 0–2)
BILIRUB SERPL-MCNC: 0.5 MG/DL — SIGNIFICANT CHANGE UP (ref 0.2–1.2)
BILIRUB UR-MCNC: NEGATIVE — SIGNIFICANT CHANGE UP
BUN SERPL-MCNC: 14 MG/DL — SIGNIFICANT CHANGE UP (ref 7–23)
CALCIUM SERPL-MCNC: 9.3 MG/DL — SIGNIFICANT CHANGE UP (ref 8.4–10.5)
CHLORIDE SERPL-SCNC: 99 MMOL/L — SIGNIFICANT CHANGE UP (ref 96–108)
CO2 SERPL-SCNC: 25 MMOL/L — SIGNIFICANT CHANGE UP (ref 22–31)
COLOR SPEC: YELLOW — SIGNIFICANT CHANGE UP
CREAT SERPL-MCNC: 0.84 MG/DL — SIGNIFICANT CHANGE UP (ref 0.5–1.3)
DIFF PNL FLD: NEGATIVE — SIGNIFICANT CHANGE UP
EOSINOPHIL # BLD AUTO: 0.37 K/UL — SIGNIFICANT CHANGE UP (ref 0–0.5)
EOSINOPHIL NFR BLD AUTO: 2.6 % — SIGNIFICANT CHANGE UP (ref 0–6)
EPI CELLS # UR: 1 /HPF — SIGNIFICANT CHANGE UP
GLUCOSE SERPL-MCNC: 105 MG/DL — HIGH (ref 70–99)
GLUCOSE UR QL: NEGATIVE — SIGNIFICANT CHANGE UP
HCT VFR BLD CALC: 51.8 % — HIGH (ref 39–50)
HGB BLD-MCNC: 17 G/DL — SIGNIFICANT CHANGE UP (ref 13–17)
HYALINE CASTS # UR AUTO: 1 /LPF — SIGNIFICANT CHANGE UP (ref 0–2)
IMM GRANULOCYTES NFR BLD AUTO: 0.8 % — SIGNIFICANT CHANGE UP (ref 0–1.5)
INR BLD: 0.97 RATIO — SIGNIFICANT CHANGE UP (ref 0.88–1.16)
KETONES UR-MCNC: NEGATIVE — SIGNIFICANT CHANGE UP
LEUKOCYTE ESTERASE UR-ACNC: NEGATIVE — SIGNIFICANT CHANGE UP
LYMPHOCYTES # BLD AUTO: 19.4 % — SIGNIFICANT CHANGE UP (ref 13–44)
LYMPHOCYTES # BLD AUTO: 2.76 K/UL — SIGNIFICANT CHANGE UP (ref 1–3.3)
MCHC RBC-ENTMCNC: 30.4 PG — SIGNIFICANT CHANGE UP (ref 27–34)
MCHC RBC-ENTMCNC: 32.8 GM/DL — SIGNIFICANT CHANGE UP (ref 32–36)
MCV RBC AUTO: 92.5 FL — SIGNIFICANT CHANGE UP (ref 80–100)
MONOCYTES # BLD AUTO: 1.05 K/UL — HIGH (ref 0–0.9)
MONOCYTES NFR BLD AUTO: 7.4 % — SIGNIFICANT CHANGE UP (ref 2–14)
NEUTROPHILS # BLD AUTO: 9.82 K/UL — HIGH (ref 1.8–7.4)
NEUTROPHILS NFR BLD AUTO: 69 % — SIGNIFICANT CHANGE UP (ref 43–77)
NITRITE UR-MCNC: NEGATIVE — SIGNIFICANT CHANGE UP
NRBC # BLD: 0 /100 WBCS — SIGNIFICANT CHANGE UP (ref 0–0)
PH UR: 6 — SIGNIFICANT CHANGE UP (ref 5–8)
PLATELET # BLD AUTO: 309 K/UL — SIGNIFICANT CHANGE UP (ref 150–400)
POTASSIUM SERPL-MCNC: 4 MMOL/L — SIGNIFICANT CHANGE UP (ref 3.5–5.3)
POTASSIUM SERPL-SCNC: 4 MMOL/L — SIGNIFICANT CHANGE UP (ref 3.5–5.3)
PROT SERPL-MCNC: 7.7 G/DL — SIGNIFICANT CHANGE UP (ref 6–8.3)
PROT UR-MCNC: ABNORMAL
PROTHROM AB SERPL-ACNC: 11.7 SEC — SIGNIFICANT CHANGE UP (ref 10.6–13.6)
RBC # BLD: 5.6 M/UL — SIGNIFICANT CHANGE UP (ref 4.2–5.8)
RBC # FLD: 13.2 % — SIGNIFICANT CHANGE UP (ref 10.3–14.5)
RBC CASTS # UR COMP ASSIST: 4 /HPF — SIGNIFICANT CHANGE UP (ref 0–4)
SODIUM SERPL-SCNC: 136 MMOL/L — SIGNIFICANT CHANGE UP (ref 135–145)
SP GR SPEC: 1.03 — HIGH (ref 1.01–1.02)
UROBILINOGEN FLD QL: ABNORMAL
WBC # BLD: 14.24 K/UL — HIGH (ref 3.8–10.5)
WBC # FLD AUTO: 14.24 K/UL — HIGH (ref 3.8–10.5)
WBC UR QL: 1 /HPF — SIGNIFICANT CHANGE UP (ref 0–5)

## 2021-12-14 PROCEDURE — 87086 URINE CULTURE/COLONY COUNT: CPT

## 2021-12-14 PROCEDURE — 76870 US EXAM SCROTUM: CPT | Mod: 26

## 2021-12-14 PROCEDURE — 81001 URINALYSIS AUTO W/SCOPE: CPT

## 2021-12-14 PROCEDURE — 99284 EMERGENCY DEPT VISIT MOD MDM: CPT | Mod: 25

## 2021-12-14 PROCEDURE — 85025 COMPLETE CBC W/AUTO DIFF WBC: CPT

## 2021-12-14 PROCEDURE — 93975 VASCULAR STUDY: CPT | Mod: 26

## 2021-12-14 PROCEDURE — 93975 VASCULAR STUDY: CPT

## 2021-12-14 PROCEDURE — 85610 PROTHROMBIN TIME: CPT

## 2021-12-14 PROCEDURE — 76870 US EXAM SCROTUM: CPT

## 2021-12-14 PROCEDURE — 80053 COMPREHEN METABOLIC PANEL: CPT

## 2021-12-14 PROCEDURE — 36415 COLL VENOUS BLD VENIPUNCTURE: CPT

## 2021-12-14 PROCEDURE — 85730 THROMBOPLASTIN TIME PARTIAL: CPT

## 2021-12-14 RX ORDER — VANCOMYCIN HCL 1 G
1000 VIAL (EA) INTRAVENOUS ONCE
Refills: 0 | Status: DISCONTINUED | OUTPATIENT
Start: 2021-12-14 | End: 2021-12-14

## 2021-12-14 RX ADMIN — Medication 400 MILLIGRAM(S): at 00:51

## 2021-12-14 RX ADMIN — Medication 650 MILLIGRAM(S): at 00:51

## 2021-12-14 NOTE — CONSULT NOTE ADULT - SUBJECTIVE AND OBJECTIVE BOX
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 Pt noted a small pimple at first which has grown in size and become more painful. Pt denies changes in the texture or color of the skin, warmth, drainage from area. States painful only when the area is rubbing against his legs otherwise reports minimal pain. Pt has a hx of similar symptoms in past, reports having an abscess many years ago which drained on its own. He states he came to ED today because his wife was concerned and brought him but no worsening symptoms noted by patient. 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 Denies applying warm compresses to area. Pt has an appointment with Dr. Kumar on Friday for his first visit. Denies hematuria, dysuria, difficulty urinating, fever, chills, abdominal pain, nausea, vomiting.     In ED: patient is AVSS, white count 14, US pending     PAST MEDICAL & SURGICAL HISTORY:  History of Columba&#x27;s gangrene    History of diverticulitis    Hypertension    Hyperlipidemia    H/O colectomy    History of appendectomy        MEDICATIONS  (STANDING):    MEDICATIONS  (PRN):      FAMILY HISTORY:      Allergies    No Known Allergies    Intolerances        SOCIAL HISTORY:    REVIEW OF SYSTEMS: Otherwise negative as stated in HPI    Physical Exam  Vital signs  T(C): 36.7 (12-13-21 @ 23:15), Max: 36.7 (12-13-21 @ 23:15)  HR: 84 (12-13-21 @ 23:15)  BP: 141/103 (12-13-21 @ 23:15)  SpO2: 98% (12-13-21 @ 23:15)  Wt(kg): --      Gen:  AWAKE ALERT NAD AXOX3    Pulm:  NO RESP DISTRESS    GI:  SOFT NT/ND    :  circumcised penis, bilateral testes descended, intertriginous areas with darkening skin changes, bilateral scrotum indurated,  black eschar on base of scrotum, no fluctuance or active drainage, no palpated crepitus, increased warmth or erythema, no inguinal hernias palpated                            	      LABS:      12-14 @ 00:26    WBC 14.24 / Hct 51.8  / SCr 0.84     12-14    136  |  99  |  14  ----------------------------<  105<H>  4.0   |  25  |  0.84    Ca    9.3      14 Dec 2021 00:26    TPro  7.7  /  Alb  4.5  /  TBili  0.5  /  DBili  x   /  AST  18  /  ALT  35  /  AlkPhos  120  12-14    PT/INR - ( 14 Dec 2021 00:26 )   PT: 11.7 sec;   INR: 0.97 ratio         PTT - ( 14 Dec 2021 00:26 )  PTT:32.8 sec      RADIOLOGY: US pending      46yMale with pmhx of HTN, HLD, diverticulitis s.p colectomy and 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 Pt noted a small pimple at first which has grown in size and become more painful. Pt denies changes in the texture or color of the skin, warmth, drainage from area. Endorses pain only when the area is rubbing against his legs, otherwise reports minimal pain. Pt has a hx of similar symptoms in past, reports having an abscess many years ago which drained on its own. He states he came to ED today because his wife was concerned and brought him but no worsening symptoms noted by patient. 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 Denies applying warm compresses to area. Pt has an appointment with Dr. Kumar on Friday for his first visit. Denies hematuria, dysuria, difficulty urinating, fever, chills, abdominal pain, nausea, vomiting.     In ED: patient is AVSS, white count 14, US prelim 4.1cm x 2cm x 3.8cm complex collection with mixed echogenic material to the superior and lateral aspect of right testicle     PAST MEDICAL & SURGICAL HISTORY:  History of Columba&#x27;s gangrene    History of diverticulitis    Hypertension    Hyperlipidemia    H/O colectomy    History of appendectomy        MEDICATIONS  (STANDING):    MEDICATIONS  (PRN):      FAMILY HISTORY:      Allergies    No Known Allergies    Intolerances        SOCIAL HISTORY:    REVIEW OF SYSTEMS: Otherwise negative as stated in HPI    Physical Exam  Vital signs  T(C): 36.7 (12-13-21 @ 23:15), Max: 36.7 (12-13-21 @ 23:15)  HR: 84 (12-13-21 @ 23:15)  BP: 141/103 (12-13-21 @ 23:15)  SpO2: 98% (12-13-21 @ 23:15)  Wt(kg): --      Gen:  AWAKE ALERT NAD AXOX3    Pulm:  NO RESP DISTRESS    GI:  SOFT NT/ND    :  circumcised penis, bilateral testes descended, intertriginous areas with darkening skin changes, bilateral scrotum indurated, black eschar on base of R scrotum with a large firm collection, minimal fluctuance, mild ttp, no palpable crepitus, increased warmth, erythema or active drainage                            	      LABS:      12-14 @ 00:26    WBC 14.24 / Hct 51.8  / SCr 0.84     12-14    136  |  99  |  14  ----------------------------<  105<H>  4.0   |  25  |  0.84    Ca    9.3      14 Dec 2021 00:26    TPro  7.7  /  Alb  4.5  /  TBili  0.5  /  DBili  x   /  AST  18  /  ALT  35  /  AlkPhos  120  12-14    PT/INR - ( 14 Dec 2021 00:26 )   PT: 11.7 sec;   INR: 0.97 ratio         PTT - ( 14 Dec 2021 00:26 )  PTT:32.8 sec      RADIOLOGY: US pending      46yMale with pmhx of HTN, HLD, diverticulitis s.p colectomy and 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 Pt noted a small pimple at first which has grown in size and become more painful. Pt denies changes in the texture or color of the skin, warmth, drainage from area. Endorses pain only when the area is rubbing against his legs, otherwise reports minimal pain. Pt has a hx of similar symptoms in past, reports having an abscess many years ago which drained on its own. He states he came to ED today because his wife was concerned and brought him but no worsening symptoms noted by patient. 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 Denies applying warm compresses to area. Pt has an appointment with Dr. Kumar on Friday for his first visit. Denies hematuria, dysuria, difficulty urinating, fever, chills, abdominal pain, nausea, vomiting.     In ED: patient is AVSS, white count 14, US prelim 4.1cm x 2cm x 3.8cm complex collection with mixed echogenic material to the superior and lateral aspect of right testicle     PAST MEDICAL & SURGICAL HISTORY:  History of Columba&#x27;s gangrene    History of diverticulitis    Hypertension    Hyperlipidemia    H/O colectomy    History of appendectomy        MEDICATIONS  (STANDING):    MEDICATIONS  (PRN):      FAMILY HISTORY:      Allergies    No Known Allergies    Intolerances        SOCIAL HISTORY:    REVIEW OF SYSTEMS: Otherwise negative as stated in HPI    Physical Exam  Vital signs  T(C): 36.7 (12-13-21 @ 23:15), Max: 36.7 (12-13-21 @ 23:15)  HR: 84 (12-13-21 @ 23:15)  BP: 141/103 (12-13-21 @ 23:15)  SpO2: 98% (12-13-21 @ 23:15)  Wt(kg): --      Gen:  AWAKE ALERT NAD AXOX3    Pulm:  NO RESP DISTRESS    GI:  SOFT NT/ND    :  circumcised penis, bilateral testes descended, intertriginous areas with darkening skin changes, bilateral scrotum indurated, black eschar on base of R scrotum with a large firm collection, minimal fluctuance, mild ttp, no palpable crepitus, increased warmth, erythema or active drainage                            	      LABS:      12-14 @ 00:26    WBC 14.24 / Hct 51.8  / SCr 0.84     12-14    136  |  99  |  14  ----------------------------<  105<H>  4.0   |  25  |  0.84    Ca    9.3      14 Dec 2021 00:26    TPro  7.7  /  Alb  4.5  /  TBili  0.5  /  DBili  x   /  AST  18  /  ALT  35  /  AlkPhos  120  12-14    PT/INR - ( 14 Dec 2021 00:26 )   PT: 11.7 sec;   INR: 0.97 ratio         PTT - ( 14 Dec 2021 00:26 )  PTT:32.8 sec      RADIOLOGY:   < from: US Testicles (12.14.21 @ 03:32) >  ACC: [id number] EXAM:  US DPLX SCROTUM                        ACC: [id number] EXAM:  US SCROTUM AND CONTENTS                          PROCEDURE DATE:  12/14/2021          INTERPRETATION:  CLINICAL INFORMATION: Right testicular boil. Evaluate   for scrotal abscess. History of Columba's gangrene.    COMPARISON: None available.    TECHNIQUE: Testicular ultrasound utilizing color and spectral Doppler.    FINDINGS:    RIGHT:  Right testis: 5.1 cm x 2.6 cm x  4.2 cm. Normal echogenicity and   echotexture with no masses or areas of architectural distortion. Normal   arterial and venous blood flow pattern.  Right epididymis: 1.1 x 0.9 x 1.2 cm. 6 mm head and 9 mm body cysts.  Right hydrocele: None.  Right varicocele: None.    4.0 x 2.1 x 3.8 cm complex collection with peripheral vascularity within   the right scrotal wall with associated wall thickening.    LEFT:  Left testis: 5.7 cm x 2.2 cm x 4.0 cm. Normal echogenicity and   echotexture with no masses or areas of architectural distortion. Normal   arterial and venous blood flow pattern.  Left epididymis: 1.6 x 1.1 cm. 9 mm head cyst.  Left hydrocele: None.  Left varicocele: Pampiniform vein measures up to 6 mm with Valsalva on   color Doppler.    IMPRESSION:    Right scrotal wall collectionwith peripheral vascularity, concerning for   abscess.    Suggestion of a left varicocele.        --- End of Report ---          DAVID ARZOLA MD; Resident Radiology  This document has been electronically signed.  JOSEY BLACKWELL MD; Attending Radiologist  This document has been electronically signed. Dec 14 2021  4:02AM    < end of copied text >

## 2021-12-14 NOTE — CONSULT NOTE ADULT - ASSESSMENT
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 Scrotum indurated bilaterally at baseline with a 4.1cm x 2cm x 3.8cm complex collection with mixed echogenic material to the superior and lateral aspect of right testicle.  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 Scrotum indurated bilaterally at baseline with a 4.1cm x 2cm x 3.8cm complex collection with mixed echogenic material to the superior and lateral aspect of right testicle. At US, abscess opened up and drained blood and pus.     Recommend:  -please send and follow up wound culture  -empiric Abx   -f/u with Dr. Kumar on Friday       case discussed with on call resident Regina Das

## 2021-12-15 LAB
CULTURE RESULTS: SIGNIFICANT CHANGE UP
SPECIMEN SOURCE: SIGNIFICANT CHANGE UP

## 2024-08-02 NOTE — H&P PST ADULT - BREASTS
No masses; no nipple discharge RN called [facility] at [phone], facility agreeable for patient to be sent back via ride share/taxi. [employer] ride arranged for patient by this RN.      Harry Schaeffer RN  08/02/24 [id number]